# Patient Record
Sex: MALE | Race: WHITE | NOT HISPANIC OR LATINO
[De-identification: names, ages, dates, MRNs, and addresses within clinical notes are randomized per-mention and may not be internally consistent; named-entity substitution may affect disease eponyms.]

---

## 2019-01-22 ENCOUNTER — TRANSCRIPTION ENCOUNTER (OUTPATIENT)
Age: 59
End: 2019-01-22

## 2019-02-01 ENCOUNTER — OUTPATIENT (OUTPATIENT)
Dept: OUTPATIENT SERVICES | Facility: HOSPITAL | Age: 59
LOS: 1 days | End: 2019-02-01
Payer: MEDICAID

## 2019-02-01 PROCEDURE — G9001: CPT

## 2019-02-21 DIAGNOSIS — Z71.89 OTHER SPECIFIED COUNSELING: ICD-10-CM

## 2020-01-15 ENCOUNTER — TRANSCRIPTION ENCOUNTER (OUTPATIENT)
Age: 60
End: 2020-01-15

## 2021-09-12 ENCOUNTER — EMERGENCY (EMERGENCY)
Facility: HOSPITAL | Age: 61
LOS: 1 days | Discharge: HOME | End: 2021-09-12
Attending: STUDENT IN AN ORGANIZED HEALTH CARE EDUCATION/TRAINING PROGRAM | Admitting: STUDENT IN AN ORGANIZED HEALTH CARE EDUCATION/TRAINING PROGRAM
Payer: MEDICAID

## 2021-09-12 VITALS
SYSTOLIC BLOOD PRESSURE: 128 MMHG | HEART RATE: 68 BPM | RESPIRATION RATE: 18 BRPM | WEIGHT: 169.98 LBS | HEIGHT: 70 IN | DIASTOLIC BLOOD PRESSURE: 78 MMHG | TEMPERATURE: 98 F | OXYGEN SATURATION: 97 %

## 2021-09-12 VITALS
HEART RATE: 51 BPM | RESPIRATION RATE: 18 BRPM | SYSTOLIC BLOOD PRESSURE: 152 MMHG | OXYGEN SATURATION: 99 % | DIASTOLIC BLOOD PRESSURE: 88 MMHG

## 2021-09-12 PROCEDURE — 99283 EMERGENCY DEPT VISIT LOW MDM: CPT

## 2021-09-12 PROCEDURE — 99282 EMERGENCY DEPT VISIT SF MDM: CPT

## 2021-09-12 RX ORDER — ACETAMINOPHEN 500 MG
650 TABLET ORAL ONCE
Refills: 0 | Status: COMPLETED | OUTPATIENT
Start: 2021-09-12 | End: 2021-09-12

## 2021-09-12 RX ORDER — DIPHENHYDRAMINE HCL 50 MG
25 CAPSULE ORAL ONCE
Refills: 0 | Status: COMPLETED | OUTPATIENT
Start: 2021-09-12 | End: 2021-09-12

## 2021-09-12 RX ORDER — DIPHENHYDRAMINE HCL 50 MG
25 CAPSULE ORAL EVERY 4 HOURS
Refills: 0 | Status: DISCONTINUED | OUTPATIENT
Start: 2021-09-12 | End: 2021-09-12

## 2021-09-12 RX ADMIN — Medication 650 MILLIGRAM(S): at 10:51

## 2021-09-12 RX ADMIN — Medication 25 MILLIGRAM(S): at 10:51

## 2021-09-12 RX ADMIN — Medication 650 MILLIGRAM(S): at 11:33

## 2021-09-12 NOTE — ED PROVIDER NOTE - CHPI ED SYMPTOMS NEG
no abdominal pain/no anxiety/no arthralgias/no bleeding at site/no blurred vision/no body aches/no chest pain/no discoloration/no dizziness/no fever/no inflammation/no itching/no rash/no redness/no red streaks/no swelling of face, tongue/no tingling/no vomiting/no weakness/no wheezing/no chills/no crying/no decreased eating/drinking/no diaphoresis/no difficulty breathing/no fussiness/no sleeping issues

## 2021-09-12 NOTE — ED ADULT NURSE NOTE - NSIMPLEMENTINTERV_GEN_ALL_ED
Implemented All Universal Safety Interventions:  [place] to call system. Call bell, personal items and telephone within reach. Instruct patient to call for assistance. Room bathroom lighting operational. Non-slip footwear when patient is off stretcher. Physically safe environment: no spills, clutter or unnecessary equipment. Stretcher in lowest position, wheels locked, appropriate side rails in place.

## 2021-09-12 NOTE — ED PROVIDER NOTE - CLINICAL SUMMARY MEDICAL DECISION MAKING FREE TEXT BOX
61 male s/p swallowing bee and able to cough up. c/o throat and ear pain. Able ot tolerate secretions exam wnl, Benadryl and monitor.

## 2021-09-12 NOTE — ED ADULT NURSE NOTE - OBJECTIVE STATEMENT
Received pt alert and orientated. Pt was drinking coffee and noticed he swallowed a bee. Pt was able to cough up the bee. Throat was irritated.  Pt brought the bee to the er. Pt is able to swallow medications and water. Move all extremities and walk. Call bell within reach. Freq rounding performed. Will continue to monitor. Safety/Comfort maintained.

## 2021-09-12 NOTE — ED PROVIDER NOTE - PATIENT PORTAL LINK FT
You can access the FollowMyHealth Patient Portal offered by [facility] by registering at the following website: [email or web address]. By joining [employer]’s FollowMyHealth portal, you will also be able to view your health information using other applications (apps) compatible with our system.

## 2021-09-12 NOTE — ED PROVIDER NOTE - ENMT, MLM
Airway patent, Nasal mucosa clear. Mouth with normal mucosa. Throat has no vesicles, no oropharyngeal exudates and uvula is midline. Able to tolerate secretions and water. Right ear + cerumen.

## 2021-09-12 NOTE — ED PROVIDER NOTE - NSFOLLOWUPINSTRUCTIONS_ED_ALL_ED_FT
Follow up with your PCP. You may take tylenol and benadryl for discomfort.     Bee, Wasp, or Hornet Sting, Adult      Bees, wasps, and hornets are part of a family of insects that can sting people. These stings can cause pain and inflammation, but they are usually not serious. However, some people may have an allergic reaction to a sting. This can cause the symptoms to be more severe.      What increases the risk?  You may be at a greater risk of getting stung if you:  •Provoke a stinging insect by swatting or disturbing it.      •Wear strong-smelling soaps, deodorants, or body sprays.      •Spend time outdoors near gardens with flowers or fruit trees or in clothes that expose skin.      •Eat or drink outside.        What are the signs or symptoms?  Common symptoms of this condition include:  •A red lump in the skin that sometimes has a tiny hole in the center. In some cases, a stinger may be in the center of the wound.      •Pain and itching at the sting site.      •Redness and swelling around the sting site. If you have an allergic reaction (localized allergic reaction), the swelling and redness may spread out from the sting site. In some cases, this reaction can continue to develop over the next 24–48 hours.    In rare cases, a person may have a severe allergic reaction (anaphylactic reaction) to a sting. Symptoms of an anaphylactic reaction may include:  •Wheezing or difficulty breathing.      •Raised, itchy, red patches on the skin (hives).      •Nausea or vomiting.      •Abdominal cramping.      •Diarrhea.      •Tightness in the chest or chest pain.      •Dizziness or fainting.      •Redness of the face (flushing).      •Hoarse voice.      •Swollen tongue, lips, or face.        How is this diagnosed?    This condition is usually diagnosed based on your symptoms and medical history as well as a physical exam. You may have an allergy test to determine if you are allergic to the substance that the insect injected during the sting (venom).      How is this treated?    If you were stung by a bee, the stinger and a small sac of venom may be in the wound. It is important to remove the stinger as soon as possible. You can do this by brushing across the wound with gauze, a fingernail, or a flat card such as a credit card. Removing the stinger can help reduce the severity of your body’s reaction to the sting.  Most stings can be treated with:  •Icing to reduce swelling in the area.      •Medicines (antihistamines) to treat itching or an allergic reaction.      •Medicines to help reduce pain. These may be medicines that you take by mouth, or medicated creams or lotions that you apply to your skin.      Pay close attention to your symptoms after you have been stung. If possible, have someone stay with you to make sure you do not have an allergic reaction. If you have any signs of an allergic reaction, call your health care provider. If you have ever had a severe allergic reaction, your health care provider may give you an inhaler or injectable medicine (epinephrine auto-injector) to use if necessary.      Follow these instructions at home:     •Wash the sting site 2–3 times each day with soap and water as told by your health care provider.      •Apply or take over-the-counter and prescription medicines only as told by your health care provider.    •If directed, apply ice to the sting area.  •Put ice in a plastic bag.      •Place a towel between your skin and the bag.      •Leave the ice on for 20 minutes, 2–3 times a day.        • Do not scratch the sting area.    •If you had a severe allergic reaction to a sting, you may need:  •To wear a medical bracelet or necklace that lists the allergy.      •To learn when and how to use an anaphylaxis kit or epinephrine injection. Your family members and coworkers may also need to learn this.      •To carry an anaphylaxis kit or epinephrine injection with you at all times.          How is this prevented?    •Avoid swatting at stinging insects and disturbing insect nests.      • Do not use fragrant soaps or lotions.      •Wear shoes, pants, and long sleeves when spending time outdoors, especially in grassy areas where stinging insects are common.      •Keep outdoor areas free from nests or hives.      •Keep food and drink containers covered when eating outdoors.      •Avoid working or sitting near flowering plants, if possible.      •Wear gloves if you are gardening or working outdoors.      •If an attack by a stinging insect or a swarm seems likely in the moment, move away from the area or find a barrier between you and the insect(s), such as a door.        Contact a health care provider if:    •Your symptoms do not get better in 2–3 days.      •You have redness, swelling, or pain that spreads beyond the area of the sting.      •You have a fever.        Get help right away if:  You have symptoms of a severe allergic reaction. These include:  •Wheezing or difficulty breathing.      •Tightness in the chest or chest pain.      •Light-headedness or fainting.      •Itchy, raised, red patches on the skin.      •Nausea or vomiting.      •Abdominal cramping.      •Diarrhea.      •A swollen tongue or lips, or trouble swallowing.      •Dizziness or fainting.        Summary    •Stings from bees, wasps, and hornets can cause pain and inflammation, but they are usually not serious. However, some people may have an allergic reaction to a sting. This can cause the symptoms to be more severe.      •Pay close attention to your symptoms after you have been stung. If possible, have someone stay with you to make sure you do not have an allergic reaction.      •Call your health care provider if you have any signs of an allergic reaction.      This information is not intended to replace advice given to you by your health care provider. Make sure you discuss any questions you have with your health care provider.      Document Revised: 12/13/2018 Document Reviewed: 02/22/2018    Elsevier Patient Education © 2021 [employer]

## 2021-09-12 NOTE — ED PROVIDER NOTE - OBJECTIVE STATEMENT
62 yo male present to ED s/p swallowing a bee in his coffee this AM. Pt was able to get bee up and brought bee to ED. Bee appears to be intact. Patient c/o right sided throat pain, right ear pain. Denies difficulty swallowing or breathing. Able to tolerate secretions and he was observed to drink iced water. Patient may have been stung in past while gardening but no reactions. Denies and pertinent medical history aside from OA and BPH. Denies CP or SOB. Denies dizziness lightheadedness. Denies n/v/d/c. Denies abd pain.

## 2021-09-12 NOTE — ED PROVIDER NOTE - ENMT NEGATIVE STATEMENT, MLM
Ears: ++ right ear pain and no hearing problems. Nose: no nasal congestion and no nasal drainage. Mouth/Throat: no dysphagia, no hoarseness and ++ right throat pain.

## 2021-09-12 NOTE — ED PROVIDER NOTE - CARE PLAN
1 Principal Discharge DX:	Bee sting  Secondary Diagnosis:	Throat pain  Secondary Diagnosis:	Ear pain, right

## 2022-06-23 NOTE — ED PROVIDER NOTE - CHIEF COMPLAINT
albuterol 1.25 mg/3 mL (0.042%) inhalation solution: 3 milliliter(s) inhaled 3 times a day, As Needed  folic acid 1 mg oral tablet: 1 tab(s) orally once a day  HYDROmorphone 4 mg oral tablet: 1 tab(s) orally every 4 hours, As Needed  hydroxyurea 1000 mg oral tablet: 1 tab(s) orally once a day  
The patient is a 61y Male complaining of foreign body GI/.

## 2023-03-21 ENCOUNTER — NON-APPOINTMENT (OUTPATIENT)
Age: 63
End: 2023-03-21

## 2023-08-15 ENCOUNTER — NON-APPOINTMENT (OUTPATIENT)
Age: 63
End: 2023-08-15

## 2023-08-28 ENCOUNTER — APPOINTMENT (OUTPATIENT)
Dept: CARDIOLOGY | Facility: CLINIC | Age: 63
End: 2023-08-28
Payer: MEDICAID

## 2023-08-28 VITALS
WEIGHT: 172 LBS | SYSTOLIC BLOOD PRESSURE: 138 MMHG | HEART RATE: 58 BPM | DIASTOLIC BLOOD PRESSURE: 62 MMHG | OXYGEN SATURATION: 98 %

## 2023-08-28 PROBLEM — Z00.00 ENCOUNTER FOR PREVENTIVE HEALTH EXAMINATION: Status: ACTIVE | Noted: 2023-08-28

## 2023-08-28 PROCEDURE — 99204 OFFICE O/P NEW MOD 45 MIN: CPT | Mod: 25

## 2023-08-28 PROCEDURE — 93000 ELECTROCARDIOGRAM COMPLETE: CPT

## 2023-08-28 NOTE — DISCUSSION/SUMMARY
[Bundle Branch Block] : ~T bundle branch block [Stable] : stable [Patient] : the patient [FreeTextEntry1] : Pt will have an Echo and Zio to evaluate possible arrhythmia. Pt will follow up in 2 months.  [EKG obtained to assist in diagnosis and management of assessed problem(s)] : EKG obtained to assist in diagnosis and management of assessed problem(s)

## 2023-08-28 NOTE — HISTORY OF PRESENT ILLNESS
[FreeTextEntry1] : 62 yo male presents for evaluation of possible cardiac arrhythmia. Pt denies chest pain or shortness of breath. He is limited by bilateral knee pain. He doesn't see his PMD. Pt rides his bicycle for exercise.

## 2023-09-05 ENCOUNTER — APPOINTMENT (OUTPATIENT)
Dept: CARDIOLOGY | Facility: CLINIC | Age: 63
End: 2023-09-05

## 2023-09-07 ENCOUNTER — TRANSCRIPTION ENCOUNTER (OUTPATIENT)
Age: 63
End: 2023-09-07

## 2023-09-07 LAB
ALBUMIN SERPL ELPH-MCNC: 4.4 G/DL
ALP BLD-CCNC: 57 U/L
ALT SERPL-CCNC: 22 U/L
ANION GAP SERPL CALC-SCNC: 10 MMOL/L
AST SERPL-CCNC: 22 U/L
BILIRUB SERPL-MCNC: 0.7 MG/DL
BUN SERPL-MCNC: 7 MG/DL
CALCIUM SERPL-MCNC: 9.6 MG/DL
CHLORIDE SERPL-SCNC: 102 MMOL/L
CHOLEST SERPL-MCNC: 164 MG/DL
CO2 SERPL-SCNC: 28 MMOL/L
CREAT SERPL-MCNC: 0.7 MG/DL
EGFR: 102 ML/MIN/1.73M2
ESTIMATED AVERAGE GLUCOSE: 117 MG/DL
GLUCOSE SERPL-MCNC: 99 MG/DL
HBA1C MFR BLD HPLC: 5.7 %
HDLC SERPL-MCNC: 65 MG/DL
LDLC SERPL CALC-MCNC: 85 MG/DL
NONHDLC SERPL-MCNC: 99 MG/DL
POTASSIUM SERPL-SCNC: 4.6 MMOL/L
PROT SERPL-MCNC: 6.6 G/DL
SODIUM SERPL-SCNC: 141 MMOL/L
T3FREE SERPL-MCNC: 3.21 PG/ML
T4 FREE SERPL-MCNC: 1.1 NG/DL
TRIGL SERPL-MCNC: 77 MG/DL
TSH SERPL-ACNC: 1.99 UIU/ML

## 2023-09-12 ENCOUNTER — NON-APPOINTMENT (OUTPATIENT)
Age: 63
End: 2023-09-12

## 2023-09-27 ENCOUNTER — NON-APPOINTMENT (OUTPATIENT)
Age: 63
End: 2023-09-27

## 2023-10-09 ENCOUNTER — APPOINTMENT (OUTPATIENT)
Dept: INTERNAL MEDICINE | Facility: CLINIC | Age: 63
End: 2023-10-09
Payer: MEDICAID

## 2023-10-09 VITALS
DIASTOLIC BLOOD PRESSURE: 80 MMHG | SYSTOLIC BLOOD PRESSURE: 144 MMHG | BODY MASS INDEX: 24.77 KG/M2 | OXYGEN SATURATION: 100 % | HEIGHT: 70 IN | WEIGHT: 173 LBS | HEART RATE: 62 BPM | RESPIRATION RATE: 12 BRPM

## 2023-10-09 VITALS — SYSTOLIC BLOOD PRESSURE: 110 MMHG | DIASTOLIC BLOOD PRESSURE: 80 MMHG

## 2023-10-09 DIAGNOSIS — I49.9 CARDIAC ARRHYTHMIA, UNSPECIFIED: ICD-10-CM

## 2023-10-09 DIAGNOSIS — N40.1 BENIGN PROSTATIC HYPERPLASIA WITH LOWER URINARY TRACT SYMPMS: ICD-10-CM

## 2023-10-09 DIAGNOSIS — Z76.89 PERSONS ENCOUNTERING HEALTH SERVICES IN OTHER SPECIFIED CIRCUMSTANCES: ICD-10-CM

## 2023-10-09 DIAGNOSIS — M17.0 BILATERAL PRIMARY OSTEOARTHRITIS OF KNEE: ICD-10-CM

## 2023-10-09 DIAGNOSIS — Z78.9 OTHER SPECIFIED HEALTH STATUS: ICD-10-CM

## 2023-10-09 PROCEDURE — 99204 OFFICE O/P NEW MOD 45 MIN: CPT

## 2024-02-20 NOTE — ED ADULT NURSE NOTE - NSFALLRSKASSESSTYPE_ED_ALL_ED
No answer, pt no showed on 2/14/24 after she was worked in. Dania't made for 3/7/24 at 2pm   Initial (On Arrival)
